# Patient Record
Sex: FEMALE | Race: WHITE | NOT HISPANIC OR LATINO | Employment: FULL TIME
[De-identification: names, ages, dates, MRNs, and addresses within clinical notes are randomized per-mention and may not be internally consistent; named-entity substitution may affect disease eponyms.]

---

## 2017-04-04 ENCOUNTER — RECORDS - HEALTHEAST (OUTPATIENT)
Dept: ADMINISTRATIVE | Facility: OTHER | Age: 57
End: 2017-04-04

## 2017-04-04 LAB
LAB AP CHARGES (HE HISTORICAL CONVERSION): NORMAL
PATH REPORT.COMMENTS IMP SPEC: NORMAL
PATH REPORT.FINAL DX SPEC: NORMAL
PATH REPORT.GROSS SPEC: NORMAL
PATH REPORT.MICROSCOPIC SPEC OTHER STN: NORMAL
PATH REPORT.RELEVANT HX SPEC: NORMAL
RESULT FLAG (HE HISTORICAL CONVERSION): NORMAL

## 2017-11-22 ENCOUNTER — OFFICE VISIT - HEALTHEAST (OUTPATIENT)
Dept: CARDIOLOGY | Facility: CLINIC | Age: 57
End: 2017-11-22

## 2017-11-22 DIAGNOSIS — I25.10 CORONARY ARTERY DISEASE INVOLVING NATIVE CORONARY ARTERY OF NATIVE HEART WITHOUT ANGINA PECTORIS: ICD-10-CM

## 2017-11-22 ASSESSMENT — MIFFLIN-ST. JEOR: SCORE: 1189.37

## 2018-09-19 ENCOUNTER — COMMUNICATION - HEALTHEAST (OUTPATIENT)
Dept: ADMINISTRATIVE | Facility: CLINIC | Age: 58
End: 2018-09-19

## 2018-12-05 ENCOUNTER — OFFICE VISIT - HEALTHEAST (OUTPATIENT)
Dept: CARDIOLOGY | Facility: CLINIC | Age: 58
End: 2018-12-05

## 2018-12-05 DIAGNOSIS — R00.2 PALPITATION: ICD-10-CM

## 2018-12-05 DIAGNOSIS — I25.10 CORONARY ARTERY DISEASE INVOLVING NATIVE CORONARY ARTERY OF NATIVE HEART WITHOUT ANGINA PECTORIS: ICD-10-CM

## 2018-12-05 LAB — TSH SERPL DL<=0.005 MIU/L-ACNC: 0.72 UIU/ML (ref 0.3–5)

## 2018-12-05 ASSESSMENT — MIFFLIN-ST. JEOR: SCORE: 1210.91

## 2018-12-14 ENCOUNTER — HOSPITAL ENCOUNTER (OUTPATIENT)
Dept: CARDIOLOGY | Facility: CLINIC | Age: 58
Discharge: HOME | End: 2018-12-14
Attending: INTERNAL MEDICINE

## 2018-12-14 DIAGNOSIS — I25.10 CORONARY ARTERY DISEASE INVOLVING NATIVE CORONARY ARTERY OF NATIVE HEART WITHOUT ANGINA PECTORIS: ICD-10-CM

## 2018-12-27 ENCOUNTER — HOSPITAL ENCOUNTER (OUTPATIENT)
Dept: CARDIOLOGY | Facility: HOSPITAL | Age: 58
Discharge: HOME | End: 2018-12-27
Attending: INTERNAL MEDICINE

## 2018-12-27 LAB
CV STRESS CURRENT BP HE: NORMAL
CV STRESS CURRENT HR HE: 101
CV STRESS CURRENT HR HE: 104
CV STRESS CURRENT HR HE: 108
CV STRESS CURRENT HR HE: 109
CV STRESS CURRENT HR HE: 111
CV STRESS CURRENT HR HE: 113
CV STRESS CURRENT HR HE: 114
CV STRESS CURRENT HR HE: 114
CV STRESS CURRENT HR HE: 116
CV STRESS CURRENT HR HE: 117
CV STRESS CURRENT HR HE: 120
CV STRESS CURRENT HR HE: 121
CV STRESS CURRENT HR HE: 123
CV STRESS CURRENT HR HE: 130
CV STRESS CURRENT HR HE: 139
CV STRESS CURRENT HR HE: 146
CV STRESS CURRENT HR HE: 147
CV STRESS CURRENT HR HE: 153
CV STRESS CURRENT HR HE: 154
CV STRESS CURRENT HR HE: 86
CV STRESS CURRENT HR HE: 87
CV STRESS CURRENT HR HE: 87
CV STRESS CURRENT HR HE: 90
CV STRESS CURRENT HR HE: 97
CV STRESS CURRENT HR HE: 99
CV STRESS CURRENT HR HE: 99
CV STRESS DEVIATION TIME HE: NORMAL
CV STRESS ECHO PERCENT HR HE: NORMAL
CV STRESS EXERCISE STAGE HE: NORMAL
CV STRESS FINAL RESTING BP HE: NORMAL
CV STRESS FINAL RESTING HR HE: 87
CV STRESS MAX HR HE: 154
CV STRESS MAX TREADMILL GRADE HE: 14
CV STRESS MAX TREADMILL SPEED HE: 3.4
CV STRESS PEAK DIA BP HE: NORMAL
CV STRESS PEAK SYS BP HE: NORMAL
CV STRESS PHASE HE: NORMAL
CV STRESS PROTOCOL HE: NORMAL
CV STRESS RESTING PT POSITION HE: NORMAL
CV STRESS ST DEVIATION AMOUNT HE: NORMAL
CV STRESS ST DEVIATION ELEVATION HE: NORMAL
CV STRESS ST EVELATION AMOUNT HE: NORMAL
CV STRESS TEST TYPE HE: NORMAL
CV STRESS TOTAL STAGE TIME MIN 1 HE: NORMAL
STRESS ECHO BASELINE BP: NORMAL
STRESS ECHO BASELINE HR: 97
STRESS ECHO CALCULATED PERCENT HR: 94 %
STRESS ECHO LAST STRESS BP: NORMAL
STRESS ECHO LAST STRESS HR: 154
STRESS ECHO POST ESTIMATED WORKLOAD: 10.3
STRESS ECHO POST EXERCISE DUR MIN: 8
STRESS ECHO POST EXERCISE DUR SEC: 30
STRESS ECHO TARGET HR: 139

## 2020-04-15 ENCOUNTER — OFFICE VISIT - HEALTHEAST (OUTPATIENT)
Dept: CARDIOLOGY | Facility: CLINIC | Age: 60
End: 2020-04-15

## 2020-04-15 DIAGNOSIS — I25.10 CORONARY ARTERY DISEASE INVOLVING NATIVE CORONARY ARTERY OF NATIVE HEART WITHOUT ANGINA PECTORIS: ICD-10-CM

## 2020-09-08 ENCOUNTER — COMMUNICATION - HEALTHEAST (OUTPATIENT)
Dept: CARDIOLOGY | Facility: CLINIC | Age: 60
End: 2020-09-08

## 2020-09-08 DIAGNOSIS — I25.10 CORONARY ARTERY DISEASE INVOLVING NATIVE CORONARY ARTERY OF NATIVE HEART WITHOUT ANGINA PECTORIS: ICD-10-CM

## 2020-12-04 ENCOUNTER — COMMUNICATION - HEALTHEAST (OUTPATIENT)
Dept: CARDIOLOGY | Facility: CLINIC | Age: 60
End: 2020-12-04

## 2020-12-04 DIAGNOSIS — I25.10 CORONARY ARTERY DISEASE INVOLVING NATIVE CORONARY ARTERY OF NATIVE HEART WITHOUT ANGINA PECTORIS: ICD-10-CM

## 2021-04-28 ENCOUNTER — COMMUNICATION - HEALTHEAST (OUTPATIENT)
Dept: CARDIOLOGY | Facility: CLINIC | Age: 61
End: 2021-04-28

## 2021-04-29 ENCOUNTER — COMMUNICATION - HEALTHEAST (OUTPATIENT)
Dept: CARDIOLOGY | Facility: CLINIC | Age: 61
End: 2021-04-29

## 2021-04-30 ENCOUNTER — COMMUNICATION - HEALTHEAST (OUTPATIENT)
Dept: CARDIOLOGY | Facility: CLINIC | Age: 61
End: 2021-04-30

## 2021-05-30 ENCOUNTER — RECORDS - HEALTHEAST (OUTPATIENT)
Dept: ADMINISTRATIVE | Facility: CLINIC | Age: 61
End: 2021-05-30

## 2021-05-31 VITALS — HEIGHT: 67 IN | WEIGHT: 130 LBS | BODY MASS INDEX: 20.4 KG/M2

## 2021-06-01 ENCOUNTER — RECORDS - HEALTHEAST (OUTPATIENT)
Dept: ADMINISTRATIVE | Facility: CLINIC | Age: 61
End: 2021-06-01

## 2021-06-02 ENCOUNTER — COMMUNICATION - HEALTHEAST (OUTPATIENT)
Dept: CARDIOLOGY | Facility: CLINIC | Age: 61
End: 2021-06-02

## 2021-06-02 ENCOUNTER — OFFICE VISIT - HEALTHEAST (OUTPATIENT)
Dept: CARDIOLOGY | Facility: CLINIC | Age: 61
End: 2021-06-02

## 2021-06-02 VITALS — HEIGHT: 67 IN | WEIGHT: 133 LBS | BODY MASS INDEX: 20.88 KG/M2

## 2021-06-02 DIAGNOSIS — I25.10 CORONARY ARTERY DISEASE INVOLVING NATIVE CORONARY ARTERY OF NATIVE HEART WITHOUT ANGINA PECTORIS: ICD-10-CM

## 2021-06-02 DIAGNOSIS — E78.5 HYPERLIPIDEMIA: ICD-10-CM

## 2021-06-02 RX ORDER — ROSUVASTATIN CALCIUM 20 MG/1
20 TABLET, COATED ORAL AT BEDTIME
Qty: 90 TABLET | Refills: 0 | Status: SHIPPED | OUTPATIENT
Start: 2021-06-02 | End: 2021-08-30

## 2021-06-04 VITALS — BODY MASS INDEX: 20.52 KG/M2 | WEIGHT: 131 LBS

## 2021-06-05 ENCOUNTER — RECORDS - HEALTHEAST (OUTPATIENT)
Dept: NEUROLOGY | Facility: CLINIC | Age: 61
End: 2021-06-05

## 2021-06-05 DIAGNOSIS — G93.89 OTHER SPECIFIED DISORDERS OF BRAIN: ICD-10-CM

## 2021-06-05 DIAGNOSIS — R20.9 DISTURBANCE OF SKIN SENSATION: ICD-10-CM

## 2021-06-05 DIAGNOSIS — R29.2 ABNORMAL REFLEX: ICD-10-CM

## 2021-06-07 NOTE — PATIENT INSTRUCTIONS - HE
Mariella Hannon,    It was a pleasure to see you today at the [facility].     My recommendations after this visit include:    Increase atorvastatin to 40 mg a day to improve control of bad cholesterol    DAREK Ibarra MD, FACC, [facility]

## 2021-06-11 NOTE — TELEPHONE ENCOUNTER
----- Message from Milady Kylee sent at 9/8/2020  2:03 PM CDT -----  General phone call:  PATIENT STATES SHE STOPPED TAKING HER LIPITOR DUE TO PAIN IN LEGS AND FEET, STOPPED FOR 3 DAYS, LEFT BETTER, NOW TAKING 1/2 PILL  FOR 20MG, AND FEELS A LOT BETTER, NO MORE PAIN, PLEASE CALL    Caller: PATIENT  Primary cardiologist: DR SKY  Detailed reason for call: SEE ABOVE  New or active symptoms? YES, SEE ABOVE  Best phone number:  cell [phone]  Best time to contact: ANY TIME  Ok to leave a detailed message? YES  Device? NO    Additional Info:   ------------------------------------------    Called pt to discuss. She states that leg pain/soreness has gotten so bad that she decided to cut her Lipitor in half. She is now taking 20 mg of Lipitor daily. Since this decrease, she's noticed a big improvement in her pain. Additionally, she requests having a repeat lipid panel drawn since her last lab draw was 12/2019.    Dr. Sky, see update from pt above. Ok to decrease to 20 mg at repeat lipid panel in 2-3 months? Any further recs?

## 2021-06-11 NOTE — TELEPHONE ENCOUNTER
Called pt and shared WTZ's recs with her. She is agreeable to plan. Med list updated and lab ordered. Msg sent to scheduling. -kcl

## 2021-06-11 NOTE — TELEPHONE ENCOUNTER
She can stay on 20 mg a day .  We should recheck lipid profile 2 months from the time she change atorvastatin back to 20 mg a day

## 2021-06-14 NOTE — PROGRESS NOTES
"Cardiology Progress Note    Assessment:    Heart palpitations due to PVCs,  improved  Nonobstructive coronary artery disease by CT coronary angiogram in 2015  Hyperlipidemia, satisfactory control  Atypical chest pain, likely musculoskeletal  Breast cancer status post left mastectomy,chemotherapy and radiation   Elevated blood pressure likely white coat phenomenon  Plan:  We went over primary prevention of cardiac events.  I encouraged her to develop a regular exercise routine.  We may want to repeat stress test next year.  Follow up in 1 year    Subjective:   This is 57 y.o. female who comes in today for routine follow-up visit.  She reports no exertional chest pain.  She occasionally gets pains between her shoulder blades and epigastric area.  Those pains are unrelated to physical activities.  She has not had any prolonged heart racing or syncope.  She feels irregular beating of the heart when she is emotionally stressed out.  She has been  going through a divorce process.  She states she gets very nervous when she comes to see a physician.  Her blood pressure has not been elevated when she checks it outside doctor's office.    Review of Systems:   General: WNL  Eyes: WNL  Ears/Nose/Throat: WNL  Lungs: WNL  Heart: WNL  Stomach: WNL  Bladder: WNL  Muscle/Joints: WNL  Skin: WNL  Nervous System: WNL  Mental Health: Anxiety     Blood: WNL    Objective:   /78 (Patient Site: Right Arm, Patient Position: Sitting, Cuff Size: Adult Regular)  Pulse 60  Resp 18  Ht 5' 6.5\" (1.689 m)  Wt 130 lb (59 kg)  BMI 20.67 kg/m2  Physical Exam:  GENERAL: no distress  NECK: No JVD  LUNGS: Clear to auscultation.  CARDIAC: regular rhythm, S1 & S2 normal.  No heaves, thrills, gallops or murmurs.  ABDOMEN: flat, negative hepatosplenomegaly, soft and non-tender.  EXTREMITIES: No evidence of cyanosis, clubbing or edema.    Current Outpatient Prescriptions   Medication Sig Dispense Refill     atorvastatin (LIPITOR) 10 MG tablet Take " 20 mg by mouth bedtime.        cholecalciferol, vitamin D3, 4,000 unit cap Take 1 capsule by mouth daily.       coenzyme Q10 (CO Q-10) 100 mg capsule Take 200 mg by mouth daily.        multivitamin (ONE A DAY) per tablet Take 1 tablet by mouth daily.       omega-3 fatty acids-fish oil (FISH OIL) 340-1,000 mg cap Take 1 capsule by mouth daily.        lactobacillus rhamnosus GG (CULTURELLE) 10-15 Billion cell capsule Take 1 capsule by mouth daily.       No current facility-administered medications for this visit.        Cardiographics:    CT coronary angiogram May 2015   1. Total Agatston score is 233 placing the patient in the middle tertile when  compared to an age and gender matched control group.  2. Moderate coronary artery disease involving the proximal left anterior descending  coronary artery with noted calcification. If symptoms are concerning can consider a  fractional flow reserve or stress nuclear study to reveal whether or not this lesion  is physiologically significant.     Stress echo: September 2016   Mild resting hypertension.   No reported chest discomfort with exercise   Exercise ECG is negative for ischemia. Occasional PVCs are noted at rest   and during exercise   Definity contrast utilized.   Resting LV function is normal.Resting LVEF estimated at 60-65%.No observed   resting wall motion abnormality noted.   Post exercise LV function is appropriately hyperdynamic without observed   wall motion abnormality.The apical lateral wall is not optimally   visualized.    Echo: April 2015   Normal left ventricular size and systolic function.  Normal left ventricular wall thickness.  Left ventricular ejection fraction is visually estimated to be 60%.  No regional wall motion abnormalities.     Holter: April 2015   Very little ventricular ectopy is noted. I think the palpitations are  related to the infrequently occurring singular PVCs, but no worrisome arrhythmias  are identified.    Lab Results:       No  results found for: CHOL  No results found for: HDL  No results found for: LDLCALC  No results found for: TRIG  No components found for: CHOLHDL  No results found for: BNP    Epifanio (Blair)  MD Stacey

## 2021-06-21 NOTE — LETTER
Letter by Epifanio Ibarra MD (Ted) at      Author: Epifanio Ibarra MD (Ted) Service: -- Author Type: --    Filed:  Encounter Date: 4/30/2021 Status: (Other)         Mariella Hannon  [address] [facility] [address] [facility] [address]      April 30, 2021      Dear Mariella,    This letter is to remind you that you are due for your follow up appointment with Dr. Blair Ibarra . To help ensure you are in the best health possible, a regular follow-up with your cardiologist is essential.     Please call our Patient Scheduling Line at [phone] to schedule your appointment at your earliest convenience.  If you have recently scheduled an appointment, please disregard this letter.    We look forward to seeing you again. As always, we are available at the number  above for any questions or concerns you may have.      Sincerely,     The Physicians and Staff of [facility]

## 2021-06-21 NOTE — LETTER
Letter by Epifanio Ibarra MD (Ted) at      Author: Epifanio Ibarra MD (Ted) Service: -- Author Type: --    Filed:  Encounter Date: 4/29/2021 Status: (Other)         Mariella Hannon  [address] [address]      April 29, 2021      Dear Mariella,    This letter is to remind you that you are due for your follow up appointment with Dr. Balir Ibarra . To help ensure you are in the best health possible, a regular follow-up with your cardiologist is essential.     Please call our Patient Scheduling Line at [phone] to schedule your appointment at your earliest convenience.  If you have recently scheduled an appointment, please disregard this letter.    We look forward to seeing you again. As always, we are available at the number  above for any questions or concerns you may have.      Sincerely,     The Physicians and Staff of [facility]

## 2021-06-22 NOTE — PROGRESS NOTES
"Cardiology Progress Note    Assessment:  Heart palpitations due to PVCs, asymptomatic  Nonobstructive moderate coronary artery disease by CT coronary angiogram in 2015, asymptomatic  Hyperlipidemia, good control  Atypical chest pain,  resolved  Breast cancer status post left mastectomy,chemotherapy and radiation   Elevated blood pressure due to white coat phenomenon          Plan:  Continue primary prevention of cardiac events with regular exercise, cholesterol control and proper diet.  TSH  She had moderate nonobstructive plaque in LAD in 2015.  I think it would be reasonable to repeat stress test to assure lack of progression of coronary artery disease.    Follow-up in 1 year    Subjective:   This is 58 y.o. female who comes in today for follow-up visit.  She has done well since last year.  She finalized her divorce.  She feels much more relaxed now.  She denies exertional chest pain.  She has not had any heart palpitations or syncope.  She exercises regularly.  She denies any symptoms during physical activities.    Review of Systems:   General: WNL  Eyes: WNL  Ears/Nose/Throat: WNL  Lungs: WNL  Heart: WNL  Stomach: WNL  Bladder: WNL  Muscle/Joints: WNL  Skin: WNL  Nervous System: WNL  Mental Health: WNL     Blood: WNL    Objective:   /84 (Patient Site: Right Arm, Patient Position: Sitting, Cuff Size: Adult Regular)   Pulse 88   Resp 16   Ht 5' 7\" (1.702 m)   Wt 133 lb (60.3 kg)   BMI 20.83 kg/m    Physical Exam:  GENERAL: no distress  NECK: No JVD  LUNGS: Clear to auscultation.  CARDIAC: regular rhythm with one ectopy, S1 & S2 normal.  No heaves, thrills, gallops or murmurs.  ABDOMEN: flat, negative hepatosplenomegaly, soft and non-tender.  EXTREMITIES: No evidence of cyanosis, clubbing or edema.    Current Outpatient Medications   Medication Sig Dispense Refill     atorvastatin (LIPITOR) 10 MG tablet Take 20 mg by mouth bedtime.        cholecalciferol, vitamin D3, 4,000 unit cap Take 1 capsule by " mouth daily.       coenzyme Q10 (CO Q-10) 100 mg capsule Take 400 mg by mouth daily .             omega-3 fatty acids-fish oil (FISH OIL) 340-1,000 mg cap Take 3 capsules by mouth daily .             lactobacillus rhamnosus GG (CULTURELLE) 10-15 Billion cell capsule Take 1 capsule by mouth daily.       multivitamin (ONE A DAY) per tablet Take 1 tablet by mouth daily.       No current facility-administered medications for this visit.        Cardiographics:    CT coronary angiogram May 2015   1. Total Agatston score is 233 placing the patient in the middle tertile when  compared to an age and gender matched control group.  2. Moderate coronary artery disease involving the proximal left anterior descending  coronary artery with noted calcification. If symptoms are concerning can consider a  fractional flow reserve or stress nuclear study to reveal whether or not this lesion  is physiologically significant.     Stress echo: September 2016   Mild resting hypertension.   No reported chest discomfort with exercise   Exercise ECG is negative for ischemia. Occasional PVCs are noted at rest   and during exercise   Definity contrast utilized.   Resting LV function is normal.Resting LVEF estimated at 60-65%.No observed   resting wall motion abnormality noted.   Post exercise LV function is appropriately hyperdynamic without observed   wall motion abnormality.The apical lateral wall is not optimally   visualized.     Echo: April 2015   Normal left ventricular size and systolic function.  Normal left ventricular wall thickness.  Left ventricular ejection fraction is visually estimated to be 60%.  No regional wall motion abnormalities.     Holter: April 2015   Very little ventricular ectopy is noted. I think the palpitations are  related to the infrequently occurring singular PVCs, but no worrisome arrhythmias  are identified.        Lab Results:       LDL 74        Epifanio (Blair)  MD Stacey

## 2021-06-26 NOTE — PATIENT INSTRUCTIONS - HE
Mariella Hannon,    It was a pleasure to see you today at the [facility].     My recommendations after this visit include:    Change atorvastatin to rosuvastatin  Check lipids in 2 months    DAREK Ibarra MD, FACC, [facility]

## 2021-06-29 NOTE — PROGRESS NOTES
"Progress Notes by Epifanio Ibarra MD (Ted) at 4/15/2020  2:30 PM     Author: Epifanio Ibarra MD (Ted) Service: -- Author Type: Physician    Filed: 4/15/2020  2:47 PM Encounter Date: 4/15/2020 Status: Signed    : Epifanio Ibarra MD (Ted) (Physician)           The patient has been notified of following:     \"This telephone visit will be conducted via a call between you and your physician/provider. We have found that certain health care needs can be provided without the need for a physical exam.  This service lets us provide the care you need with a phone conversation.  If a prescription is necessary we can send it directly to your pharmacy.  If lab work is needed we can place an order for that and you can then stop by our lab to have the test done at a later time. If during the course of the call the physician/provider feels a telephone visit is not appropriate, you will not be charged for this service.\" Verbal consent has been obtained for this service by care team member:         HEART CARE PHONE ENCOUNTER        The patient has chosen to have the visit conducted as a telephone visit, to reduce risk of exposure given the current status of Coronavirus in our community. This telephone visit is being conducted via a call between the patient and physician/provider. Health care needs are being provided without a physical exam.     Assessment/Recommendations   Assessment:    Nonobstructive moderate coronary artery disease by CT coronary angiogram in 2015, asymptomatic  Hypercholesterolemia, suboptimal control  Atypical chest pain,  resolved  Breast cancer status post left mastectomy,chemotherapy and radiation   Elevated blood pressure due to white coat phenomenon    Plan:  Increase atorvastatin to 40 mg a day to retard progression of atherosclerosis      Follow Up Plan: Follow up in 1 year  I have reviewed the note as documented.  This accurately captures the substance of my " conversation with the patient.    Total time of call between patient and provider was 15 minutes   Start Time: 2:25 PM  Stop Time: 2:40 PM       History of Present Illness/Subjective    Mariella Hannon is a 59 y.o. female who is being evaluated via a billable telephone visit.    She reports no new cardiac symptoms.  She denies chest pain no shortness of breath.  She is physically active.  She had negative stress test in 2018.  She is compliant with cardiac medications.  She has no side effects of statin.    I have reviewed and updated the patient's Past Medical History, Social History, Family History and Medication List.     Physical Examination not performed given phone encounter Review of Systems                                                Medical History  Surgical History Family History Social History   Coronary artery disease  mastectomy  family history of premature coronary artery disease Social History     Socioeconomic History   ? Marital status:      Spouse name: Not on file   ? Number of children: Not on file   ? Years of education: Not on file   ? Highest education level: Not on file   Occupational History   ? Not on file   Social Needs   ? Financial resource strain: Not on file   ? Food insecurity     Worry: Not on file     Inability: Not on file   ? Transportation needs     Medical: Not on file     Non-medical: Not on file   Tobacco Use   ? Smoking status: Former Smoker     Last attempt to quit: 4/15/1979     Years since quittin.0   ? Smokeless tobacco: Never Used   Substance and Sexual Activity   ? Alcohol use: Not on file   ? Drug use: Not on file   ? Sexual activity: Not on file   Lifestyle   ? Physical activity     Days per week: Not on file     Minutes per session: Not on file   ? Stress: Not on file   Relationships   ? Social connections     Talks on phone: Not on file     Gets together: Not on file     Attends Spiritism service: Not on file     Active member of club or organization:  Not on file     Attends meetings of clubs or organizations: Not on file     Relationship status: Not on file   ? Intimate partner violence     Fear of current or ex partner: Not on file     Emotionally abused: Not on file     Physically abused: Not on file     Forced sexual activity: Not on file   Other Topics Concern   ? Not on file   Social History Narrative   ? Not on file          Medications  Allergies   Current Outpatient Medications   Medication Sig Dispense Refill   ? coenzyme Q10 (CO Q-10) 300 mg cap Take 2 capsules by mouth daily.     ? ergocalciferol, vitamin D2, 2,000 unit Tab Take 1 tablet by mouth daily.     ? omega-3 fatty acids-fish oil (FISH OIL) 340-1,000 mg cap Take 2 capsules by mouth daily.      ? atorvastatin (LIPITOR) 40 MG tablet Take 1 tablet (40 mg total) by mouth at bedtime. 90 tablet 3   ? cholecalciferol, vitamin D3, 4,000 unit cap Take 1 capsule by mouth daily.     ? coenzyme Q10 (CO Q-10) 100 mg capsule Take 400 mg by mouth daily .           ? lactobacillus rhamnosus GG (CULTURELLE) 10-15 Billion cell capsule Take 1 capsule by mouth daily.     ? multivitamin (ONE A DAY) per tablet Take 1 tablet by mouth daily.       No current facility-administered medications for this visit.     Allergies   Allergen Reactions   ? Codeine Itching   ? Nubain [Nalbuphine] Itching         Lab Results    Chemistry/lipid CBC Cardiac Enzymes/BNP/TSH/INR   Lab Results   Component Value Date    CREATININE 0.74 04/13/2015    BUN 9 04/13/2015    K 3.5 04/13/2015     04/13/2015     04/13/2015    CO2 22 04/13/2015    Lab Results   Component Value Date    WBC 6.8 04/13/2015    HGB 13.9 04/13/2015    HCT 40.1 04/13/2015    MCV 93 04/13/2015     04/13/2015    Lab Results   Component Value Date    TSH 0.72 12/05/2018        Epifanio Ibarra (Ted)

## 2021-07-04 NOTE — PROGRESS NOTES
Progress Notes by Epifanio Ibarra MD (Ted) at 6/2/2021 11:30 AM     Author: Epifanio Ibarra MD (Ted) Service: -- Author Type: Physician    Filed: 6/2/2021 12:09 PM Encounter Date: 6/2/2021 Status: Signed    : Epifanio Ibarra MD (Ted) (Physician)           Cardiology Progress Note    Assessment:  Heart palpitations due to PVCs, minimally symptomatic  Nonobstructive moderate coronary artery disease by CT coronary angiogram in 2015, asymptomatic  Hypercholesterolemia, improved control  Breast cancer status post left mastectomy,chemotherapy and radiation   Elevated blood pressure due to white coat phenomenon  Mitral regurgitation, mild      Plan:  Change atorvastatin to rosuvastatin 20 mg a day in order to improve LDL control.  Check lipids in 2 months    I encouraged her to stay physically active    We do need to do any stress test or echo unless she has new chest symptoms    Follow-up in 1 year    Subjective:   This is 60 y.o. female who comes in today for visit.  She denies chest pains or shortness of breath.  She goes for daily walks with her dog.  She has not had prolonged heart racing.  She occasionally feels irregular beating of the heart.  She denies syncope.  Her weight has been stable.  She has no PND and orthopnea.  She was unable to take 40 mg of atorvastatin because of muscle aching.  She is down to 20 mg a day    Review of Systems:   General: WNL  Eyes: WNL  Ears/Nose/Throat: WNL  Lungs: WNL  Heart: WNL  Stomach: WNL  Bladder: WNL  Muscle/Joints: WNL  Skin: WNL  Nervous System: WNL  Mental Health: WNL     Blood: WNL    Objective:   /80 (Patient Site: Right Arm, Patient Position: Sitting, Cuff Size: Adult Regular)   Pulse 91   Resp 16   Wt 138 lb (62.6 kg)   SpO2 100%   BMI 21.61 kg/m    Physical Exam:  GENERAL: no distress  NECK: No JVD  LUNGS: Clear to auscultation.  CARDIAC: regular rhythm, S1 & S2 normal.  No heaves, thrills, gallops.  Soft systolic  murmur at the apex  ABDOMEN: flat, negative hepatosplenomegaly, soft and non-tender.  EXTREMITIES: No evidence of cyanosis, clubbing or edema.    Current Outpatient Medications   Medication Sig Dispense Refill   ? cholecalciferol, vitamin D3, 50 mcg (2,000 unit) Tab Take 2,000 Units by mouth daily.      ? coenzyme Q10 (CO Q-10) 300 mg cap Take 2 capsules by mouth daily.     ? glucosamine HCl/chondroitin sánchez (GLUCOSAMINE-CHONDROITIN ORAL) Take 1 tablet by mouth daily.     ? omega-3 fatty acids-fish oil (FISH OIL) 340-1,000 mg cap Take 2 capsules by mouth daily.      ? coenzyme Q10 (CO Q-10) 100 mg capsule Take 400 mg by mouth daily .           ? ergocalciferol, vitamin D2, 2,000 unit Tab Take 1 tablet by mouth daily.     ? lactobacillus rhamnosus GG (CULTURELLE) 10-15 Billion cell capsule Take 1 capsule by mouth daily.     ? multivitamin (ONE A DAY) per tablet Take 1 tablet by mouth daily.     ? rosuvastatin (CRESTOR) 10 MG tablet Take 2 tablets (20 mg total) by mouth at bedtime. 30 tablet 12     No current facility-administered medications for this visit.        Cardiographics:      CT coronary angiogram May 2015   1. Total Agatston score is 233 placing the patient in the middle tertile when  compared to an age and gender matched control group.  2. Moderate coronary artery disease involving the proximal left anterior descending  coronary artery with noted calcification. If symptoms are concerning can consider a  fractional flow reserve or stress nuclear study to reveal whether or not this lesion  is physiologically significant.     Stress echo: September 2016   Mild resting hypertension.   No reported chest discomfort with exercise   Exercise ECG is negative for ischemia. Occasional PVCs are noted at rest   and during exercise   Definity contrast utilized.   Resting LV function is normal.Resting LVEF estimated at 60-65%.No observed   resting wall motion abnormality noted.   Post exercise LV function is appropriately  hyperdynamic without observed   wall motion abnormality.The apical lateral wall is not optimally   visualized.     Echo: April 2015   Normal left ventricular size and systolic function.  Normal left ventricular wall thickness.  Left ventricular ejection fraction is visually estimated to be 60%.  No regional wall motion abnormalities.     Holter: April 2015   Very little ventricular ectopy is noted. I think the palpitations are  related to the infrequently occurring singular PVCs, but no worrisome arrhythmias  are identified.  Lab Results:       LDL in December 2020 76      Epifanio (Shanthi Ibarra MD

## 2021-07-06 VITALS
BODY MASS INDEX: 21.61 KG/M2 | WEIGHT: 138 LBS | RESPIRATION RATE: 16 BRPM | OXYGEN SATURATION: 100 % | DIASTOLIC BLOOD PRESSURE: 80 MMHG | SYSTOLIC BLOOD PRESSURE: 162 MMHG | HEART RATE: 91 BPM

## 2021-08-23 ENCOUNTER — TELEPHONE (OUTPATIENT)
Dept: CARDIOLOGY | Facility: CLINIC | Age: 61
End: 2021-08-23

## 2021-08-23 NOTE — TELEPHONE ENCOUNTER
----- Message from Ashlyn Higginbotham sent at 8/23/2021  8:43 AM CDT -----  Regarding: Shannan barrett  General phone call:    Caller: Lori  Primary cardiologist: Shannan  Detailed reason for call: pt missed appt back on 8/11 for her lipid lab, pt thought it was today. Pt is stating that she needs another lab work. Please advise    Best phone number: [phone]  Best time to contact: any  Ok to leave a detailed message? yes  Device? No     Additional Info:       Noted message. Sent back to schedulers to simply reschedule the lab appointment- Lipid profile is still ordered. Nursing cannot schedule. -[facility] [place]

## 2021-08-24 ENCOUNTER — LAB (OUTPATIENT)
Dept: CARDIOLOGY | Facility: CLINIC | Age: 61
End: 2021-08-24
Payer: COMMERCIAL

## 2021-08-24 DIAGNOSIS — I25.10 CORONARY ARTERY DISEASE INVOLVING NATIVE CORONARY ARTERY OF NATIVE HEART WITHOUT ANGINA PECTORIS: ICD-10-CM

## 2021-08-24 LAB
CHOLEST SERPL-MCNC: 107 MG/DL
FASTING STATUS PATIENT QL REPORTED: YES
HDLC SERPL-MCNC: 43 MG/DL
LDLC SERPL CALC-MCNC: 56 MG/DL
TRIGL SERPL-MCNC: 40 MG/DL

## 2021-08-24 PROCEDURE — 36415 COLL VENOUS BLD VENIPUNCTURE: CPT

## 2021-08-24 PROCEDURE — 80061 LIPID PANEL: CPT

## 2021-08-30 DIAGNOSIS — E78.5 HYPERLIPIDEMIA: ICD-10-CM

## 2021-08-30 RX ORDER — ROSUVASTATIN CALCIUM 20 MG/1
20 TABLET, COATED ORAL AT BEDTIME
Qty: 90 TABLET | Refills: 0 | Status: SHIPPED | OUTPATIENT
Start: 2021-08-30 | End: 2021-11-26

## 2021-08-30 RX ORDER — ROSUVASTATIN CALCIUM 20 MG/1
20 TABLET, COATED ORAL
COMMUNITY
Start: 2021-06-02 | End: 2022-10-05

## 2021-10-17 ENCOUNTER — HEALTH MAINTENANCE LETTER (OUTPATIENT)
Age: 61
End: 2021-10-17

## 2022-07-05 ENCOUNTER — OFFICE VISIT (OUTPATIENT)
Dept: CARDIOLOGY | Facility: CLINIC | Age: 62
End: 2022-07-05
Payer: COMMERCIAL

## 2022-07-05 VITALS
RESPIRATION RATE: 16 BRPM | WEIGHT: 134 LBS | DIASTOLIC BLOOD PRESSURE: 76 MMHG | HEART RATE: 81 BPM | BODY MASS INDEX: 20.99 KG/M2 | SYSTOLIC BLOOD PRESSURE: 138 MMHG

## 2022-07-05 DIAGNOSIS — R00.2 PALPITATION: Primary | ICD-10-CM

## 2022-07-05 PROCEDURE — 99214 OFFICE O/P EST MOD 30 MIN: CPT | Performed by: INTERNAL MEDICINE

## 2022-07-05 NOTE — PATIENT INSTRUCTIONS
Mariella Hannon,    It was a pleasure to see you today at the [facility].     My recommendations after this visit include:    juan Ibarra MD, FACC, [facility]TH[facility]

## 2022-07-05 NOTE — LETTER
7/5/2022    TIERRA TORO MD  [facility] [address] [facility] [address]    RE: Mariella Hannon       Dear Colleague,     I had the pleasure of seeing Mariella Hannon in the [facility].      Cardiology Progress Note     Assessment:  Heart palpitations due to PVCs, mildly symptomatic  Nonobstructive moderate coronary artery disease by CT coronary angiogram in 2015, asymptomatic  Hypercholesterolemia, good control  Breast cancer status post left mastectomy,chemotherapy and radiation   Elevated blood pressure due to white coat phenomenon  Mitral regurgitation, mild      Plan:  Echo to reassess LV function and severity of mitral regurgitation  Continue primary prevention of cardiac events    Follow-up in 1 year    Subjective:   This is 61 year old female who comes in today for follow-up visit.  She reports no exertional chest pain or shortness of breath.  She continues to experience occasional irregular beating of the heart.  She has not had sustained heart racing or syncope.  Her weight has been stable.  She has no PND and orthopnea.    Review of Systems:   Negative other than history of present illness    Objective:   /76 (BP Location: Right arm, Patient Position: Sitting, Cuff Size: Adult Regular)   Pulse 81   Resp 16   Wt 60.8 kg (134 lb)   BMI 20.99 kg/m    Physical Exam:  GENERAL: no distress  NECK: No JVD  LUNGS: Clear to auscultation.  CARDIAC: regular rhythm, S1 & S2 normal.  No heaves, thrills, gallops or murmurs.  ABDOMEN: flat, negative hepatosplenomegaly, soft and non-tender.  EXTREMITIES: No evidence of cyanosis, clubbing or edema.    Current Outpatient Medications   Medication Sig Dispense Refill     cholecalciferol, vitamin D3, 50 mcg (2,000 unit) Tab [CHOLECALCIFEROL, VITAMIN D3, 50 MCG (2,000 UNIT) TAB] Take 2,000 Units by mouth daily.        ergocalciferol, vitamin D2, 2,000 unit Tab [ERGOCALCIFEROL, VITAMIN D2, 2,000 UNIT TAB] Take 1 tablet by mouth  daily.       glucosamine HCl/chondroitin sánchez (GLUCOSAMINE-CHONDROITIN ORAL) [GLUCOSAMINE HCL/CHONDROITIN SÁNCHEZ (GLUCOSAMINE-CHONDROITIN ORAL)] Take 1 tablet by mouth daily.       MEDICATION CANNOT BE REORDERED - PLEASE MANUALLY REORDER AND DISCONTINUE THE OLD ORDER [COENZYME Q10 (CO Q-10) 300 MG CAP] Take 2 capsules by mouth daily.       omega-3 fatty acids-fish oil (FISH OIL) 340-1,000 mg cap [OMEGA-3 FATTY ACIDS-FISH OIL (FISH OIL) 340-1,000 MG CAP] Take 2 capsules by mouth daily.        rosuvastatin (CRESTOR) 20 MG tablet Take 20 mg by mouth         Cardiographics:    CT coronary angiogram May 2015   1. Total Agatston score is 233 placing the patient in the middle tertile when  compared to an age and gender matched control group.  2. Moderate coronary artery disease involving the proximal left anterior descending  coronary artery with noted calcification. If symptoms are concerning can consider a  fractional flow reserve or stress nuclear study to reveal whether or not this lesion  is physiologically significant.     Stress echo: September 2016   Mild resting hypertension.   No reported chest discomfort with exercise   Exercise ECG is negative for ischemia. Occasional PVCs are noted at rest   and during exercise   Definity contrast utilized.   Resting LV function is normal.Resting LVEF estimated at 60-65%.No observed   resting wall motion abnormality noted.   Post exercise LV function is appropriately hyperdynamic without observed   wall motion abnormality.The apical lateral wall is not optimally   visualized.     Echo: April 2015   Normal left ventricular size and systolic function.  Normal left ventricular wall thickness.  Left ventricular ejection fraction is visually estimated to be 60%.  No regional wall motion abnormalities.     Holter: April 2015   Very little ventricular ectopy is noted. I think the palpitations are  related to the infrequently occurring singular PVCs, but no worrisome arrhythmias  are  identified.     Lab Results    Chemistry/lipid CBC Cardiac Enzymes/BNP/TSH/INR   Recent Labs   Lab Test 08/24/21  0945   CHOL 107   HDL 43*   LDL 56   TRIG 40     Recent Labs   Lab Test 08/24/21  0945   LDL 56     No results for input(s): NA, POTASSIUM, CHLORIDE, CO2, GLC, BUN, CR, GFRESTIMATED, ALICIA in the last 19762 hours.    Invalid input(s): GRFESTBLACK  No results for input(s): CR in the last 76367 hours.  No results for input(s): A1C in the last 01069 hours.       No results for input(s): WBC, HGB, HCT, MCV, PLT in the last 50300 hours.  No results for input(s): HGB in the last 95876 hours. No results for input(s): TROPONINI in the last 44650 hours.  No results for input(s): BNP, NTBNPI, NTBNP in the last 64111 hours.  Recent Labs   Lab Test 12/05/18  0837   TSH 0.72     No results for input(s): INR in the last 84711 hours.               Thank you for allowing me to participate in the care of your patient.      Sincerely,     Blair Ibarra MD     [facility]  cc:   No referring provider defined for this encounter.

## 2022-07-05 NOTE — PROGRESS NOTES
Cardiology Progress Note     Assessment:  Heart palpitations due to PVCs, mildly symptomatic  Nonobstructive moderate coronary artery disease by CT coronary angiogram in 2015, asymptomatic  Hypercholesterolemia, good control  Breast cancer status post left mastectomy,chemotherapy and radiation   Elevated blood pressure due to white coat phenomenon  Mitral regurgitation, mild      Plan:  Echo to reassess LV function and severity of mitral regurgitation  Continue primary prevention of cardiac events    Follow-up in 1 year    Subjective:   This is 61 year old female who comes in today for follow-up visit.  She reports no exertional chest pain or shortness of breath.  She continues to experience occasional irregular beating of the heart.  She has not had sustained heart racing or syncope.  Her weight has been stable.  She has no PND and orthopnea.    Review of Systems:   Negative other than history of present illness    Objective:   /76 (BP Location: Right arm, Patient Position: Sitting, Cuff Size: Adult Regular)   Pulse 81   Resp 16   Wt 60.8 kg (134 lb)   BMI 20.99 kg/m    Physical Exam:  GENERAL: no distress  NECK: No JVD  LUNGS: Clear to auscultation.  CARDIAC: regular rhythm, S1 & S2 normal.  No heaves, thrills, gallops or murmurs.  ABDOMEN: flat, negative hepatosplenomegaly, soft and non-tender.  EXTREMITIES: No evidence of cyanosis, clubbing or edema.    Current Outpatient Medications   Medication Sig Dispense Refill     cholecalciferol, vitamin D3, 50 mcg (2,000 unit) Tab [CHOLECALCIFEROL, VITAMIN D3, 50 MCG (2,000 UNIT) TAB] Take 2,000 Units by mouth daily.        ergocalciferol, vitamin D2, 2,000 unit Tab [ERGOCALCIFEROL, VITAMIN D2, 2,000 UNIT TAB] Take 1 tablet by mouth daily.       glucosamine HCl/chondroitin sánchez (GLUCOSAMINE-CHONDROITIN ORAL) [GLUCOSAMINE HCL/CHONDROITIN SÁNCHEZ (GLUCOSAMINE-CHONDROITIN ORAL)] Take 1 tablet by mouth daily.       MEDICATION CANNOT BE REORDERED - PLEASE MANUALLY  REORDER AND DISCONTINUE THE OLD ORDER [COENZYME Q10 (CO Q-10) 300 MG CAP] Take 2 capsules by mouth daily.       omega-3 fatty acids-fish oil (FISH OIL) 340-1,000 mg cap [OMEGA-3 FATTY ACIDS-FISH OIL (FISH OIL) 340-1,000 MG CAP] Take 2 capsules by mouth daily.        rosuvastatin (CRESTOR) 20 MG tablet Take 20 mg by mouth         Cardiographics:    CT coronary angiogram May 2015   1. Total Agatston score is 233 placing the patient in the middle tertile when  compared to an age and gender matched control group.  2. Moderate coronary artery disease involving the proximal left anterior descending  coronary artery with noted calcification. If symptoms are concerning can consider a  fractional flow reserve or stress nuclear study to reveal whether or not this lesion  is physiologically significant.     Stress echo: September 2016   Mild resting hypertension.   No reported chest discomfort with exercise   Exercise ECG is negative for ischemia. Occasional PVCs are noted at rest   and during exercise   Definity contrast utilized.   Resting LV function is normal.Resting LVEF estimated at 60-65%.No observed   resting wall motion abnormality noted.   Post exercise LV function is appropriately hyperdynamic without observed   wall motion abnormality.The apical lateral wall is not optimally   visualized.     Echo: April 2015   Normal left ventricular size and systolic function.  Normal left ventricular wall thickness.  Left ventricular ejection fraction is visually estimated to be 60%.  No regional wall motion abnormalities.     Holter: April 2015   Very little ventricular ectopy is noted. I think the palpitations are  related to the infrequently occurring singular PVCs, but no worrisome arrhythmias  are identified.     Lab Results    Chemistry/lipid CBC Cardiac Enzymes/BNP/TSH/INR   Recent Labs   Lab Test 08/24/21  0945   CHOL 107   HDL 43*   LDL 56   TRIG 40     Recent Labs   Lab Test 08/24/21  0945   LDL 56     No results for  input(s): NA, POTASSIUM, CHLORIDE, CO2, GLC, BUN, CR, GFRESTIMATED, ALICIA in the last 52681 hours.    Invalid input(s): GRFESTBLACK  No results for input(s): CR in the last 28958 hours.  No results for input(s): A1C in the last 39481 hours.       No results for input(s): WBC, HGB, HCT, MCV, PLT in the last 07872 hours.  No results for input(s): HGB in the last 67594 hours. No results for input(s): TROPONINI in the last 55011 hours.  No results for input(s): BNP, NTBNPI, NTBNP in the last 09929 hours.  Recent Labs   Lab Test 12/05/18  0837   TSH 0.72     No results for input(s): INR in the last 09901 hours.

## 2022-07-12 ENCOUNTER — HOSPITAL ENCOUNTER (OUTPATIENT)
Dept: CARDIOLOGY | Facility: CLINIC | Age: 62
Discharge: HOME | End: 2022-07-12
Attending: INTERNAL MEDICINE | Admitting: INTERNAL MEDICINE
Payer: COMMERCIAL

## 2022-07-12 DIAGNOSIS — R00.2 PALPITATION: ICD-10-CM

## 2022-07-12 LAB — LVEF ECHO: NORMAL

## 2022-07-12 PROCEDURE — 93306 TTE W/DOPPLER COMPLETE: CPT | Mod: 26 | Performed by: INTERNAL MEDICINE

## 2022-07-12 PROCEDURE — 93306 TTE W/DOPPLER COMPLETE: CPT

## 2022-10-01 ENCOUNTER — HEALTH MAINTENANCE LETTER (OUTPATIENT)
Age: 62
End: 2022-10-01

## 2022-10-05 DIAGNOSIS — E78.5 HYPERLIPIDEMIA: Primary | ICD-10-CM

## 2022-10-05 RX ORDER — ROSUVASTATIN CALCIUM 20 MG/1
20 TABLET, COATED ORAL DAILY
Qty: 90 TABLET | Refills: 3 | Status: SHIPPED | OUTPATIENT
Start: 2022-10-05 | End: 2023-10-17

## 2023-02-05 ENCOUNTER — HEALTH MAINTENANCE LETTER (OUTPATIENT)
Age: 63
End: 2023-02-05

## 2023-10-17 DIAGNOSIS — E78.5 HYPERLIPIDEMIA: ICD-10-CM

## 2023-10-17 RX ORDER — ROSUVASTATIN CALCIUM 20 MG/1
20 TABLET, COATED ORAL DAILY
Qty: 90 TABLET | Refills: 3 | Status: SHIPPED | OUTPATIENT
Start: 2023-10-17

## 2023-10-21 ENCOUNTER — HEALTH MAINTENANCE LETTER (OUTPATIENT)
Age: 63
End: 2023-10-21

## 2024-02-02 ENCOUNTER — OFFICE VISIT (OUTPATIENT)
Dept: CARDIOLOGY | Facility: CLINIC | Age: 64
End: 2024-02-02
Payer: COMMERCIAL

## 2024-02-02 VITALS
HEART RATE: 100 BPM | BODY MASS INDEX: 21.6 KG/M2 | WEIGHT: 137.9 LBS | SYSTOLIC BLOOD PRESSURE: 158 MMHG | RESPIRATION RATE: 16 BRPM | DIASTOLIC BLOOD PRESSURE: 88 MMHG | OXYGEN SATURATION: 100 %

## 2024-02-02 DIAGNOSIS — I25.119 CORONARY ARTERY DISEASE INVOLVING NATIVE CORONARY ARTERY OF NATIVE HEART WITH ANGINA PECTORIS (H): Primary | ICD-10-CM

## 2024-02-02 PROCEDURE — 99214 OFFICE O/P EST MOD 30 MIN: CPT | Performed by: INTERNAL MEDICINE

## 2024-02-02 RX ORDER — MAGNESIUM GLYCINATE 100 MG
200 CAPSULE ORAL DAILY
COMMUNITY
Start: 2022-05-01

## 2024-02-02 NOTE — LETTER
2/2/2024    TIERRA TORO MD  [address] [place] [address]    RE: Mariella Hannon       Dear Colleague,     I had the pleasure of seeing Mariella Hannon in the [facility].      Cardiology Progress Note     Assessment:    Heart palpitations due to PVCs, mildly symptomatic improved after discontinuation caffeine  Coronary artery disease, moderate LAD disease by CT coronary angiogram in 2015, asymptomatic  Hypercholesterolemia, satisfactory control  Breast cancer status post left mastectomy,chemotherapy and radiation   Elevated blood pressure due to white coat phenomenon  Mitral regurgitation, mild stable    Plan:  Stress echo because of moderate LAD stenosis and long interval from previous testing  Continue primary prevention of cardiac events with regular exercise , healthy Mediterranean style diet and cholesterol management.    I do not think we need to do anything more for brief episodes of heart palpitations as long as there is no evidence of progression of coronary artery disease or LV dysfunction.    Routine follow-up in 1 year    Subjective:   This is 63 year old female who comes in today for follow-up visit.  She reports no chest pains but she continues to get occasional heart palpitations.  Those are brief episodes that feel like of short flutters.  She has not had syncope or near syncope.  She is staying fairly physically active.    Review of Systems:   Negative other than history of present illness    Objective:   BP (!) 158/88 (BP Location: Right arm, Patient Position: Sitting, Cuff Size: Adult Regular)   Pulse 100   Resp 16   Wt 62.6 kg (137 lb 14.4 oz)   SpO2 100%   BMI 21.60 kg/m    Physical Exam:  GENERAL: no distress  NECK: No JVD  LUNGS: Clear to auscultation.  CARDIAC: regular rhythm, S1 & S2 normal.  No heaves, thrills, gallops or murmurs.  ABDOMEN: flat, negative hepatosplenomegaly, soft and non-tender.  EXTREMITIES: No evidence of cyanosis, clubbing or  edema.    Current Outpatient Medications   Medication Sig Dispense Refill    cholecalciferol, vitamin D3, 50 mcg (2,000 unit) Tab [CHOLECALCIFEROL, VITAMIN D3, 50 MCG (2,000 UNIT) TAB] Take 2,000 Units by mouth daily.       glucosamine HCl/chondroitin sánchez (GLUCOSAMINE-CHONDROITIN ORAL) [GLUCOSAMINE HCL/CHONDROITIN SÁNCHEZ (GLUCOSAMINE-CHONDROITIN ORAL)] Take 1 tablet by mouth daily.      magnesium glycinate 100 MG CAPS capsule 200 mg daily      MEDICATION CANNOT BE REORDERED - PLEASE MANUALLY REORDER AND DISCONTINUE THE OLD ORDER [COENZYME Q10 (CO Q-10) 300 MG CAP] Take 2 capsules by mouth daily.      omega-3 fatty acids-fish oil (FISH OIL) 340-1,000 mg cap [OMEGA-3 FATTY ACIDS-FISH OIL (FISH OIL) 340-1,000 MG CAP] Take 2 capsules by mouth daily.       rosuvastatin (CRESTOR) 20 MG tablet TAKE 1 TABLET BY MOUTH EVERY DAY 90 tablet 3       Cardiographics:    CT coronary angiogram May 2015   1. Total Agatston score is 233 placing the patient in the middle tertile when  compared to an age and gender matched control group.  2. Moderate coronary artery disease involving the proximal left anterior descending  coronary artery with noted calcification. If symptoms are concerning can consider a  fractional flow reserve or stress nuclear study to reveal whether or not this lesion  is physiologically significant.     Stress echo: September 2016   Mild resting hypertension.   No reported chest discomfort with exercise   Exercise ECG is negative for ischemia. Occasional PVCs are noted at rest   and during exercise   Definity contrast utilized.   Resting LV function is normal.Resting LVEF estimated at 60-65%.No observed   resting wall motion abnormality noted.   Post exercise LV function is appropriately hyperdynamic without observed   wall motion abnormality.The apical lateral wall is not optimally   visualized.     December 2018    Stress echocardiogram is negative for inducible ischemia.    The stress electrocardiogram is negative  "for inducible ischemic EKG changes.    The patient's exercise tolerance was normal.    4 beat run of supraventricular tachycardia occurring during recovery, without associated symptoms      Echo: April 2015   Normal left ventricular size and systolic function.  Normal left ventricular wall thickness.  Left ventricular ejection fraction is visually estimated to be 60%.  No regional wall motion abnormalities.     July 2022  1.Left ventricular size, wall motion and function are normal. The ejection  fraction is 60-65%.  2.Normal right ventricle size and systolic function.  3.There is mild (1+) mitral regurgitation.  4.Trivial anterior pericardial effusion There are no echocardiographic  indications of cardiac tamponade.      Holter: April 2015   Very little ventricular ectopy is noted. I think the palpitations are  related to the infrequently occurring singular PVCs, but no worrisome arrhythmias  are identified.     Lab Results    Chemistry/lipid CBC Cardiac Enzymes/BNP/TSH/INR   Recent Labs   Lab Test 08/24/21  0945   CHOL 107   HDL 43*   LDL 56   TRIG 40     Recent Labs   Lab Test 08/24/21  0945   LDL 56     No results for input(s): \"NA\", \"POTASSIUM\", \"CHLORIDE\", \"CO2\", \"GLC\", \"BUN\", \"CR\", \"GFRESTIMATED\", \"ALICIA\" in the last 79321 hours.    Invalid input(s): \"GRFESTBLACK\"  No results for input(s): \"CR\" in the last 51329 hours.  No results for input(s): \"A1C\" in the last 60414 hours.       No results for input(s): \"WBC\", \"HGB\", \"HCT\", \"MCV\", \"PLT\" in the last 23284 hours.  No results for input(s): \"HGB\" in the last 87236 hours. No results for input(s): \"TROPONINI\" in the last 03793 hours.  No results for input(s): \"BNP\", \"NTBNPI\", \"NTBNP\" in the last 09009 hours.  Recent Labs   Lab Test 12/05/18  0837   TSH 0.72     No results for input(s): \"INR\" in the last 92404 hours.                      Thank you for allowing me to participate in the care of your patient.      Sincerely,     Blair Ibarra MD     [facility]" [facility]  cc:   Referred Self,

## 2024-02-02 NOTE — PROGRESS NOTES
Cardiology Progress Note     Assessment:    Heart palpitations due to PVCs, mildly symptomatic improved after discontinuation caffeine  Coronary artery disease, moderate LAD disease by CT coronary angiogram in 2015, asymptomatic  Hypercholesterolemia, satisfactory control  Breast cancer status post left mastectomy,chemotherapy and radiation   Elevated blood pressure due to white coat phenomenon  Mitral regurgitation, mild stable    Plan:  Stress echo because of moderate LAD stenosis and long interval from previous testing  Continue primary prevention of cardiac events with regular exercise , healthy Mediterranean style diet and cholesterol management.    I do not think we need to do anything more for brief episodes of heart palpitations as long as there is no evidence of progression of coronary artery disease or LV dysfunction.    Routine follow-up in 1 year    Subjective:   This is 63 year old female who comes in today for follow-up visit.  She reports no chest pains but she continues to get occasional heart palpitations.  Those are brief episodes that feel like of short flutters.  She has not had syncope or near syncope.  She is staying fairly physically active.    Review of Systems:   Negative other than history of present illness    Objective:   BP (!) 158/88 (BP Location: Right arm, Patient Position: Sitting, Cuff Size: Adult Regular)   Pulse 100   Resp 16   Wt 62.6 kg (137 lb 14.4 oz)   SpO2 100%   BMI 21.60 kg/m    Physical Exam:  GENERAL: no distress  NECK: No JVD  LUNGS: Clear to auscultation.  CARDIAC: regular rhythm, S1 & S2 normal.  No heaves, thrills, gallops or murmurs.  ABDOMEN: flat, negative hepatosplenomegaly, soft and non-tender.  EXTREMITIES: No evidence of cyanosis, clubbing or edema.    Current Outpatient Medications   Medication Sig Dispense Refill    cholecalciferol, vitamin D3, 50 mcg (2,000 unit) Tab [CHOLECALCIFEROL, VITAMIN D3, 50 MCG (2,000 UNIT) TAB] Take 2,000 Units by mouth  daily.       glucosamine HCl/chondroitin sánchez (GLUCOSAMINE-CHONDROITIN ORAL) [GLUCOSAMINE HCL/CHONDROITIN SÁNCHEZ (GLUCOSAMINE-CHONDROITIN ORAL)] Take 1 tablet by mouth daily.      magnesium glycinate 100 MG CAPS capsule 200 mg daily      MEDICATION CANNOT BE REORDERED - PLEASE MANUALLY REORDER AND DISCONTINUE THE OLD ORDER [COENZYME Q10 (CO Q-10) 300 MG CAP] Take 2 capsules by mouth daily.      omega-3 fatty acids-fish oil (FISH OIL) 340-1,000 mg cap [OMEGA-3 FATTY ACIDS-FISH OIL (FISH OIL) 340-1,000 MG CAP] Take 2 capsules by mouth daily.       rosuvastatin (CRESTOR) 20 MG tablet TAKE 1 TABLET BY MOUTH EVERY DAY 90 tablet 3       Cardiographics:    CT coronary angiogram May 2015   1. Total Agatston score is 233 placing the patient in the middle tertile when  compared to an age and gender matched control group.  2. Moderate coronary artery disease involving the proximal left anterior descending  coronary artery with noted calcification. If symptoms are concerning can consider a  fractional flow reserve or stress nuclear study to reveal whether or not this lesion  is physiologically significant.     Stress echo: September 2016   Mild resting hypertension.   No reported chest discomfort with exercise   Exercise ECG is negative for ischemia. Occasional PVCs are noted at rest   and during exercise   Definity contrast utilized.   Resting LV function is normal.Resting LVEF estimated at 60-65%.No observed   resting wall motion abnormality noted.   Post exercise LV function is appropriately hyperdynamic without observed   wall motion abnormality.The apical lateral wall is not optimally   visualized.     December 2018    Stress echocardiogram is negative for inducible ischemia.    The stress electrocardiogram is negative for inducible ischemic EKG changes.    The patient's exercise tolerance was normal.    4 beat run of supraventricular tachycardia occurring during recovery, without associated symptoms      Echo: April 2015  "  Normal left ventricular size and systolic function.  Normal left ventricular wall thickness.  Left ventricular ejection fraction is visually estimated to be 60%.  No regional wall motion abnormalities.     July 2022  1.Left ventricular size, wall motion and function are normal. The ejection  fraction is 60-65%.  2.Normal right ventricle size and systolic function.  3.There is mild (1+) mitral regurgitation.  4.Trivial anterior pericardial effusion There are no echocardiographic  indications of cardiac tamponade.      Holter: April 2015   Very little ventricular ectopy is noted. I think the palpitations are  related to the infrequently occurring singular PVCs, but no worrisome arrhythmias  are identified.     Lab Results    Chemistry/lipid CBC Cardiac Enzymes/BNP/TSH/INR   Recent Labs   Lab Test 08/24/21  0945   CHOL 107   HDL 43*   LDL 56   TRIG 40     Recent Labs   Lab Test 08/24/21  0945   LDL 56     No results for input(s): \"NA\", \"POTASSIUM\", \"CHLORIDE\", \"CO2\", \"GLC\", \"BUN\", \"CR\", \"GFRESTIMATED\", \"ALICIA\" in the last 44744 hours.    Invalid input(s): \"GRFESTBLACK\"  No results for input(s): \"CR\" in the last 98693 hours.  No results for input(s): \"A1C\" in the last 31392 hours.       No results for input(s): \"WBC\", \"HGB\", \"HCT\", \"MCV\", \"PLT\" in the last 70121 hours.  No results for input(s): \"HGB\" in the last 96197 hours. No results for input(s): \"TROPONINI\" in the last 87364 hours.  No results for input(s): \"BNP\", \"NTBNPI\", \"NTBNP\" in the last 94973 hours.  Recent Labs   Lab Test 12/05/18  0837   TSH 0.72     No results for input(s): \"INR\" in the last 29472 hours.                  "

## 2024-02-07 ENCOUNTER — TELEPHONE (OUTPATIENT)
Dept: CARDIOLOGY | Facility: CLINIC | Age: 64
End: 2024-02-07
Payer: COMMERCIAL

## 2024-02-07 NOTE — TELEPHONE ENCOUNTER
----- Message from Alonso Alves sent at 2024 10:18 AM CST -----  Regarding: RE: Denied Echo Stress - Peer To Peer Requested  Juan Rosa,    I called the insurance was able to fax the 24 visit and the results of the 5/6/15 CTA Angiogram. I was told it takes up to 2 business days to get a determination. I don't know if I'll have a response before Mariella's Echo on 24 though.     Thank you,    Kathy  ----- Message -----  From: Shelley Dewey, RN  Sent: 2024   9:08 AM CST  To: Epifanio Ibarra MD; Alonso Alves  Subject: RE: Denied Echo Stress - Peer To Peer Reques#    Thanks for letting us know. Dr. Ibarra is out of the office this week, so I do not foresee a peer to peer being possible. Is there still an option to share additional information to this claim?    After reviewing Dr. Ibarra's note, he documented that repeat imaging was recommended as she has known moderate coronary artery disease based on previous CCTA and it had been some time since she last had a stress test to follow up on this. Below it states that imaging could be needed/approved without symptoms when someone has a known problem of decreased blow flow to the heart. Was his most recent note shared with her insurance company and/or could it be shared along with previous CCTA result showing known moderate CAD? The date on this exam is 5/15/2015.     If no additional information can be provided, please let me know and I will go ahead and update the patient and cancel the test until Dr. Ibarra is back and able to provide other recommendations.     Thanks!  Shelley VILLALTA for Dr. Ibarra     ----- Message -----  From: Alonso Alves  Sent: 2024   7:07 AM CST  To: Shelley Dewey, AMBAR; Epifanio Ibarra MD  Subject: Denied Echo Stress - Peer To Peer Requested      [facility] Denial Notification    Patient Name:  Mariella Hannon  :    1960  MRN:    [id number]    Dr. Ibarra,    The authorization for  procedure ECHO STRESS ECHOCARDIOGRAM  on date of service 2/09/24 has been denied by the patient's insurance for the following reason:    Denial Reason:   You must have one of the following new, recurring, or worsening symptoms.  -Chest pain.  -Shortness of breath brought on by exercise (exertional dyspnea).  -Feeling overtired after activity (exertional fatigue).    Imaging must be needed for one of the following reasons when you are not having symptoms.  -Once every two years when you have a known problem of decreased blood flow to your heart that does not cause symptoms (silent ischemia).  -Prior to starting a Class IC drug that suppresses heart rhythms that are not normal or once a year while you are taking it.  -Other indication as listed in this guideline.    Notes from your doctor must show you have pain, pressure or discomfort in your chest, upper belly (epigastric), shoulder, arm or jaw that occurs with stress (physical or emotional) then made better by rest, medicine (nitroglycerin) or both. These symptoms must be new, recurrent/persistent, or worsening.     We need to see results of an electrocardiogram (a tracing of your heart's actions) that show this study is needed.    Below is the information we provided to the patient's insurance company:    Order:    2/2/24  Visit Notes:   7/5/22 & 6/2/21  Results:   7/12/22 Echo  Other:    N/A    A [id number] review can be done by calling the insurance/third party authorization vendor with the following information:    Insurance: [employer]  Auth Vendor:  [employer]  Phone #: [phone] opt 4  Due Date: ASAP    Patient ID: [id number]  Case/Ref #: [id number]    Please let us know how you wish to proceed as soon as possible. We may contact the patient if we do not receive a response.    Thank you,    Kathy Almonte Prior Authorization

## 2024-02-07 NOTE — TELEPHONE ENCOUNTER
Called Lori and explained the situation with stress test and that it was first denied and now pending approval based on additional information provided. She verbalized understanding and is appreciative. Review of additional information provided could take up to 2 business days. Writer will check in tomorrow to see if this was possibly approved early, otherwise we may have to reschedule stress test or even change test should the denial be upheld. Right now stress test is scheduled 2/9. Understanding verbalized. Writer will reach out tomorrow. -[facility] [place]

## 2024-02-08 NOTE — TELEPHONE ENCOUNTER
Called patient and updated her that the stress echo approval is still pending. She asked that stress echo be cancelled and if we find out that it is approved, will call back and reschedule this. Writer cancelled appt. -fito

## 2024-02-15 NOTE — TELEPHONE ENCOUNTER
Recommendations discussed with pt.  Pt verbalized understanding and confirms she is not having any chest discomfort.  Pt will call back if chest discomfort develops in the future.  Stress echo order discontinued.  -ral    ----- Message -----  From: Epifanio Ibarra MD  Sent: 2/15/2024  11:26 AM CST  To: Shelley Dewey RN    If she not having any chest discomfort and we can forego stress test.

## 2024-02-15 NOTE — TELEPHONE ENCOUNTER
Provider updated - new orders or peer to peer.    ----- Message -----  From: Alonso Alves I  Sent: 2/15/2024   8:43 AM CST  To: Shelley Dewey RN  Subject: Denial Upheld                                    Good morning Shelley,    I just got off the phone with the nurse reviewer at [facility] and she stated that the denial was upheld for the original denial reason. However, we can still have a peer to peer performed at this time since the Echo hasn't been performed yet.

## 2024-10-20 DIAGNOSIS — E78.5 HYPERLIPIDEMIA: ICD-10-CM

## 2024-10-22 RX ORDER — ROSUVASTATIN CALCIUM 20 MG/1
20 TABLET, COATED ORAL DAILY
Qty: 90 TABLET | Refills: 3 | Status: SHIPPED | OUTPATIENT
Start: 2024-10-22

## 2024-12-08 ENCOUNTER — HEALTH MAINTENANCE LETTER (OUTPATIENT)
Age: 64
End: 2024-12-08

## 2025-02-20 ENCOUNTER — OFFICE VISIT (OUTPATIENT)
Dept: CARDIOLOGY | Facility: CLINIC | Age: 65
End: 2025-02-20
Payer: COMMERCIAL

## 2025-02-20 VITALS
SYSTOLIC BLOOD PRESSURE: 160 MMHG | BODY MASS INDEX: 21.96 KG/M2 | OXYGEN SATURATION: 99 % | WEIGHT: 140.2 LBS | HEART RATE: 93 BPM | DIASTOLIC BLOOD PRESSURE: 86 MMHG | RESPIRATION RATE: 20 BRPM

## 2025-02-20 DIAGNOSIS — R07.2 PRECORDIAL PAIN: Primary | ICD-10-CM

## 2025-02-20 DIAGNOSIS — I25.119 CORONARY ARTERY DISEASE INVOLVING NATIVE CORONARY ARTERY OF NATIVE HEART WITH ANGINA PECTORIS: ICD-10-CM

## 2025-02-20 RX ORDER — ROSUVASTATIN CALCIUM 40 MG/1
40 TABLET, COATED ORAL DAILY
Qty: 90 TABLET | Refills: 3 | Status: SHIPPED | OUTPATIENT
Start: 2025-02-20

## 2025-02-20 NOTE — LETTER
2/20/2025    TIERRA TORO MD  ***no Info Available 7/25/2023***    RE: Mariella Hannon       Dear Colleague,     I had the pleasure of seeing Mariella Hannon in the [facility].      Cardiology Progress Note     Assessment:  Coronary artery disease, moderate LAD disease by CT coronary angiogram in 2015  Hypercholesterolemia, suboptimal LDL control  Breast cancer status post left mastectomy,chemotherapy and radiation in 2004  Elevated blood pressure due to white coat phenomenon  Mitral regurgitation, historically mild by echo, mild by exam today      Plan:  Stress echo  Increase the rosuvastatin to 40 mg a day and check lipids in 3 months  Follow-up in 1 year  The longitudinal plan of care for the diagnosis(es)/condition(s) as documented were addressed during this visit. Due to the added complexity in care, I will continue to support Lori in the subsequent management and with ongoing continuity of care.   Subjective:   This is 64 year old female who comes in today for follow-up visit.  She reports that heart palpitations resolved after discontinuation of caffeine  She continues to play golf regularly.  She does not get typical exertional chest pain.  Her weight has been stable.  She has no PND and orthopnea  She checks blood pressure at home occasionally.  Her home readings are less than 120/80  Review of Systems:   Negative other than history of present illness    Objective:   BP (!) 160/86 (BP Location: Left arm, Patient Position: Sitting, Cuff Size: Adult Regular)   Pulse 93   Resp 20   Wt 63.6 kg (140 lb 3.2 oz)   SpO2 99%   BMI 21.96 kg/m    Physical Exam:  GENERAL: no distress  NECK: No JVD  LUNGS: Clear to auscultation.  CARDIAC: regular rhythm, S1 & S2 normal.  No heaves, thrills, gallops.  2/6 systolic murmur at the apex  ABDOMEN: flat, negative hepatosplenomegaly, soft and non-tender.  EXTREMITIES: No evidence of cyanosis, clubbing or edema.    Current Outpatient Medications    Medication Sig Dispense Refill     cholecalciferol, vitamin D3, 50 mcg (2,000 unit) Tab [CHOLECALCIFEROL, VITAMIN D3, 50 MCG (2,000 UNIT) TAB] Take 2,000 Units by mouth daily.        magnesium glycinate 100 MG CAPS capsule 200 mg daily       MEDICATION CANNOT BE REORDERED - PLEASE MANUALLY REORDER AND DISCONTINUE THE OLD ORDER [COENZYME Q10 (CO Q-10) 300 MG CAP] Take 2 capsules by mouth daily.       omega-3 fatty acids-fish oil (FISH OIL) 340-1,000 mg cap [OMEGA-3 FATTY ACIDS-FISH OIL (FISH OIL) 340-1,000 MG CAP] Take 2 capsules by mouth daily.        rosuvastatin (CRESTOR) 40 MG tablet Take 1 tablet (40 mg) by mouth daily. 90 tablet 3       Cardiographics:    CT coronary angiogram May 2015   1. Total Agatston score is 233 placing the patient in the middle tertile when  compared to an age and gender matched control group.  2. Moderate coronary artery disease involving the proximal left anterior descending  coronary artery with noted calcification. If symptoms are concerning can consider a  fractional flow reserve or stress nuclear study to reveal whether or not this lesion  is physiologically significant.     Stress echo: September 2016   Mild resting hypertension.   No reported chest discomfort with exercise   Exercise ECG is negative for ischemia. Occasional PVCs are noted at rest   and during exercise   Definity contrast utilized.   Resting LV function is normal.Resting LVEF estimated at 60-65%.No observed   resting wall motion abnormality noted.   Post exercise LV function is appropriately hyperdynamic without observed   wall motion abnormality.The apical lateral wall is not optimally   visualized.     December 2018    Stress echocardiogram is negative for inducible ischemia.    The stress electrocardiogram is negative for inducible ischemic EKG changes.    The patient's exercise tolerance was normal.    4 beat run of supraventricular tachycardia occurring during recovery, without associated symptoms       "  Echo: April 2015   Normal left ventricular size and systolic function.  Normal left ventricular wall thickness.  Left ventricular ejection fraction is visually estimated to be 60%.  No regional wall motion abnormalities.     July 2022  1.Left ventricular size, wall motion and function are normal. The ejection  fraction is 60-65%.  2.Normal right ventricle size and systolic function.  3.There is mild (1+) mitral regurgitation.  4.Trivial anterior pericardial effusion There are no echocardiographic  indications of cardiac tamponade.        Holter: April 2015   Very little ventricular ectopy is noted. I think the palpitations are  related to the infrequently occurring singular PVCs, but no worrisome arrhythmias  are identified.     Lab Results    Chemistry/lipid CBC Cardiac Enzymes/BNP/TSH/INR   Recent Labs   Lab Test 08/24/21  0945   CHOL 107   HDL 43*   LDL 56   TRIG 40     Recent Labs   Lab Test 08/24/21  0945   LDL 56     No results for input(s): \"NA\", \"POTASSIUM\", \"CHLORIDE\", \"CO2\", \"GLC\", \"BUN\", \"CR\", \"GFRESTIMATED\", \"ALICIA\" in the last 22015 hours.    Invalid input(s): \"GRFESTBLACK\"  No results for input(s): \"CR\" in the last 24047 hours.  No results for input(s): \"A1C\" in the last 87459 hours.       No results for input(s): \"WBC\", \"HGB\", \"HCT\", \"MCV\", \"PLT\" in the last 29840 hours.  No results for input(s): \"HGB\" in the last 14801 hours. No results for input(s): \"TROPONINI\" in the last 76721 hours.  No results for input(s): \"BNP\", \"NTBNPI\", \"NTBNP\" in the last 33050 hours.  Recent Labs   Lab Test 12/05/18  0837   TSH 0.72     No results for input(s): \"INR\" in the last 29584 hours.                      Thank you for allowing me to participate in the care of your patient.      Sincerely,     Blair Ibarra MD     [facility]  cc:   Epifanio Ibarra MD  [address]  [address]  [place] [address]      "

## 2025-02-20 NOTE — PATIENT INSTRUCTIONS
Mariella Hannon,    It was a pleasure to see you today at [facility].     My recommendations after this visit include:    Increase rosuvastatin to 40 mg a day, check lipids in 3 months  Stress echo  Regular exercise    DAREK Ibarra MD, FACC, [facility]

## 2025-02-20 NOTE — PROGRESS NOTES
Cardiology Progress Note     Assessment:  Coronary artery disease, moderate LAD disease by CT coronary angiogram in 2015  Hypercholesterolemia, suboptimal LDL control  Breast cancer status post left mastectomy,chemotherapy and radiation in 2004  Elevated blood pressure due to white coat phenomenon  Mitral regurgitation, historically mild by echo, mild by exam today      Plan:  Stress echo  Increase the rosuvastatin to 40 mg a day and check lipids in 3 months  Follow-up in 1 year  The longitudinal plan of care for the diagnosis(es)/condition(s) as documented were addressed during this visit. Due to the added complexity in care, I will continue to support Lori in the subsequent management and with ongoing continuity of care.   Subjective:   This is 64 year old female who comes in today for follow-up visit.  She reports that heart palpitations resolved after discontinuation of caffeine  She continues to play golf regularly.  She does not get typical exertional chest pain.  Her weight has been stable.  She has no PND and orthopnea  She checks blood pressure at home occasionally.  Her home readings are less than 120/80  Review of Systems:   Negative other than history of present illness    Objective:   BP (!) 160/86 (BP Location: Left arm, Patient Position: Sitting, Cuff Size: Adult Regular)   Pulse 93   Resp 20   Wt 63.6 kg (140 lb 3.2 oz)   SpO2 99%   BMI 21.96 kg/m    Physical Exam:  GENERAL: no distress  NECK: No JVD  LUNGS: Clear to auscultation.  CARDIAC: regular rhythm, S1 & S2 normal.  No heaves, thrills, gallops.  2/6 systolic murmur at the apex  ABDOMEN: flat, negative hepatosplenomegaly, soft and non-tender.  EXTREMITIES: No evidence of cyanosis, clubbing or edema.    Current Outpatient Medications   Medication Sig Dispense Refill    cholecalciferol, vitamin D3, 50 mcg (2,000 unit) Tab [CHOLECALCIFEROL, VITAMIN D3, 50 MCG (2,000 UNIT) TAB] Take 2,000 Units by mouth daily.       magnesium glycinate 100  MG CAPS capsule 200 mg daily      MEDICATION CANNOT BE REORDERED - PLEASE MANUALLY REORDER AND DISCONTINUE THE OLD ORDER [COENZYME Q10 (CO Q-10) 300 MG CAP] Take 2 capsules by mouth daily.      omega-3 fatty acids-fish oil (FISH OIL) 340-1,000 mg cap [OMEGA-3 FATTY ACIDS-FISH OIL (FISH OIL) 340-1,000 MG CAP] Take 2 capsules by mouth daily.       rosuvastatin (CRESTOR) 40 MG tablet Take 1 tablet (40 mg) by mouth daily. 90 tablet 3       Cardiographics:    CT coronary angiogram May 2015   1. Total Agatston score is 233 placing the patient in the middle tertile when  compared to an age and gender matched control group.  2. Moderate coronary artery disease involving the proximal left anterior descending  coronary artery with noted calcification. If symptoms are concerning can consider a  fractional flow reserve or stress nuclear study to reveal whether or not this lesion  is physiologically significant.     Stress echo: September 2016   Mild resting hypertension.   No reported chest discomfort with exercise   Exercise ECG is negative for ischemia. Occasional PVCs are noted at rest   and during exercise   Definity contrast utilized.   Resting LV function is normal.Resting LVEF estimated at 60-65%.No observed   resting wall motion abnormality noted.   Post exercise LV function is appropriately hyperdynamic without observed   wall motion abnormality.The apical lateral wall is not optimally   visualized.     December 2018    Stress echocardiogram is negative for inducible ischemia.    The stress electrocardiogram is negative for inducible ischemic EKG changes.    The patient's exercise tolerance was normal.    4 beat run of supraventricular tachycardia occurring during recovery, without associated symptoms        Echo: April 2015   Normal left ventricular size and systolic function.  Normal left ventricular wall thickness.  Left ventricular ejection fraction is visually estimated to be 60%.  No regional wall motion  "abnormalities.     July 2022  1.Left ventricular size, wall motion and function are normal. The ejection  fraction is 60-65%.  2.Normal right ventricle size and systolic function.  3.There is mild (1+) mitral regurgitation.  4.Trivial anterior pericardial effusion There are no echocardiographic  indications of cardiac tamponade.        Holter: April 2015   Very little ventricular ectopy is noted. I think the palpitations are  related to the infrequently occurring singular PVCs, but no worrisome arrhythmias  are identified.     Lab Results    Chemistry/lipid CBC Cardiac Enzymes/BNP/TSH/INR   Recent Labs   Lab Test 08/24/21  0945   CHOL 107   HDL 43*   LDL 56   TRIG 40     Recent Labs   Lab Test 08/24/21  0945   LDL 56     No results for input(s): \"NA\", \"POTASSIUM\", \"CHLORIDE\", \"CO2\", \"GLC\", \"BUN\", \"CR\", \"GFRESTIMATED\", \"ALICIA\" in the last 86602 hours.    Invalid input(s): \"GRFESTBLACK\"  No results for input(s): \"CR\" in the last 52355 hours.  No results for input(s): \"A1C\" in the last 64794 hours.       No results for input(s): \"WBC\", \"HGB\", \"HCT\", \"MCV\", \"PLT\" in the last 83869 hours.  No results for input(s): \"HGB\" in the last 00381 hours. No results for input(s): \"TROPONINI\" in the last 53364 hours.  No results for input(s): \"BNP\", \"NTBNPI\", \"NTBNP\" in the last 73704 hours.  Recent Labs   Lab Test 12/05/18  0837   TSH 0.72     No results for input(s): \"INR\" in the last 48364 hours.                  "

## 2025-03-13 ENCOUNTER — HOSPITAL ENCOUNTER (OUTPATIENT)
Dept: CARDIOLOGY | Facility: CLINIC | Age: 65
Discharge: HOME | End: 2025-03-13
Attending: INTERNAL MEDICINE
Payer: COMMERCIAL

## 2025-03-13 DIAGNOSIS — R07.2 PRECORDIAL PAIN: ICD-10-CM

## 2025-03-13 LAB
CV STRESS CURRENT BP HE: NORMAL
CV STRESS CURRENT HR HE: 102
CV STRESS CURRENT HR HE: 102
CV STRESS CURRENT HR HE: 103
CV STRESS CURRENT HR HE: 104
CV STRESS CURRENT HR HE: 107
CV STRESS CURRENT HR HE: 107
CV STRESS CURRENT HR HE: 108
CV STRESS CURRENT HR HE: 116
CV STRESS CURRENT HR HE: 117
CV STRESS CURRENT HR HE: 118
CV STRESS CURRENT HR HE: 119
CV STRESS CURRENT HR HE: 119
CV STRESS CURRENT HR HE: 131
CV STRESS CURRENT HR HE: 133
CV STRESS CURRENT HR HE: 134
CV STRESS CURRENT HR HE: 135
CV STRESS CURRENT HR HE: 137
CV STRESS CURRENT HR HE: 137
CV STRESS CURRENT HR HE: 140
CV STRESS CURRENT HR HE: 145
CV STRESS CURRENT HR HE: 146
CV STRESS CURRENT HR HE: 90
CV STRESS CURRENT HR HE: 96
CV STRESS CURRENT HR HE: 99
CV STRESS DEVIATION TIME HE: NORMAL
CV STRESS ECHO PERCENT HR HE: NORMAL
CV STRESS EXERCISE STAGE HE: NORMAL
CV STRESS EXERCISE STAGE REACHED HE: NORMAL
CV STRESS FINAL RESTING BP HE: NORMAL
CV STRESS FINAL RESTING HR HE: 96
CV STRESS MAX HR HE: 148
CV STRESS MAX TREADMILL GRADE HE: 14
CV STRESS MAX TREADMILL SPEED HE: 3.4
CV STRESS PEAK DIA BP HE: NORMAL
CV STRESS PEAK SYS BP HE: NORMAL
CV STRESS PHASE HE: NORMAL
CV STRESS PROTOCOL HE: NORMAL
CV STRESS REASON STOPPED HE: NORMAL
CV STRESS RESTING PT POSITION HE: NORMAL
CV STRESS ST DEVIATION AMOUNT HE: NORMAL
CV STRESS ST DEVIATION ELEVATION HE: NORMAL
CV STRESS ST EVELATION AMOUNT HE: NORMAL
CV STRESS SYMPTOMS HE: NORMAL
CV STRESS TEST TYPE HE: NORMAL
CV STRESS TOTAL STAGE TIME MIN 1 HE: NORMAL
STRESS ECHO BASELINE DIASTOLIC HE: 90
STRESS ECHO BASELINE HR: 118
STRESS ECHO BASELINE SYSTOLIC BP: 147
STRESS ECHO LAST STRESS DIASTOLIC BP: 78
STRESS ECHO LAST STRESS HR: 146
STRESS ECHO LAST STRESS SYSTOLIC BP: 156
STRESS ECHO POST ESTIMATED WORKLOAD: 9.5
STRESS ECHO POST EXERCISE DUR MIN: 7
STRESS ECHO POST EXERCISE DUR SEC: 30
STRESS ECHO TARGET HR: 133

## 2025-03-13 PROCEDURE — 255N000002 HC RX 255 OP 636: Performed by: INTERNAL MEDICINE

## 2025-03-13 RX ADMIN — PERFLUTREN 3 ML: 6.52 INJECTION, SUSPENSION INTRAVENOUS at 08:46

## 2025-05-20 ENCOUNTER — RESULTS FOLLOW-UP (OUTPATIENT)
Dept: CARDIOLOGY | Facility: CLINIC | Age: 65
End: 2025-05-20

## 2025-05-20 ENCOUNTER — LAB (OUTPATIENT)
Dept: CARDIOLOGY | Facility: CLINIC | Age: 65
End: 2025-05-20
Payer: COMMERCIAL

## 2025-05-20 DIAGNOSIS — I25.119 CORONARY ARTERY DISEASE INVOLVING NATIVE CORONARY ARTERY OF NATIVE HEART WITH ANGINA PECTORIS: ICD-10-CM

## 2025-05-20 LAB
CHOLEST SERPL-MCNC: 134 MG/DL
FASTING STATUS PATIENT QL REPORTED: YES
HDLC SERPL-MCNC: 42 MG/DL
LDLC SERPL CALC-MCNC: 80 MG/DL
NONHDLC SERPL-MCNC: 92 MG/DL
TRIGL SERPL-MCNC: 61 MG/DL

## 2025-05-20 PROCEDURE — 80061 LIPID PANEL: CPT

## 2025-05-20 PROCEDURE — 36415 COLL VENOUS BLD VENIPUNCTURE: CPT
